# Patient Record
Sex: MALE | Race: WHITE | Employment: STUDENT | URBAN - METROPOLITAN AREA
[De-identification: names, ages, dates, MRNs, and addresses within clinical notes are randomized per-mention and may not be internally consistent; named-entity substitution may affect disease eponyms.]

---

## 2023-02-15 ENCOUNTER — IMMUNIZATION (OUTPATIENT)
Dept: FAMILY MEDICINE CLINIC | Facility: CLINIC | Age: 33
End: 2023-02-15
Payer: COMMERCIAL

## 2023-02-15 DIAGNOSIS — Z23 NEEDS FLU SHOT: Primary | ICD-10-CM

## 2023-02-15 PROCEDURE — 90686 IIV4 VACC NO PRSV 0.5 ML IM: CPT | Performed by: NURSE PRACTITIONER

## 2023-02-15 PROCEDURE — 90471 IMMUNIZATION ADMIN: CPT | Performed by: NURSE PRACTITIONER

## 2024-02-27 ENCOUNTER — OFFICE VISIT (OUTPATIENT)
Dept: FAMILY MEDICINE CLINIC | Facility: CLINIC | Age: 34
End: 2024-02-27
Payer: COMMERCIAL

## 2024-02-27 VITALS
SYSTOLIC BLOOD PRESSURE: 96 MMHG | RESPIRATION RATE: 18 BRPM | OXYGEN SATURATION: 98 % | WEIGHT: 185 LBS | HEART RATE: 82 BPM | HEIGHT: 70.75 IN | BODY MASS INDEX: 25.9 KG/M2 | DIASTOLIC BLOOD PRESSURE: 54 MMHG | TEMPERATURE: 97 F

## 2024-02-27 DIAGNOSIS — Z23 NEED FOR VACCINATION: ICD-10-CM

## 2024-02-27 DIAGNOSIS — L91.8 ST (SKIN TAG): ICD-10-CM

## 2024-02-27 DIAGNOSIS — Z20.2 HPV EXPOSURE: ICD-10-CM

## 2024-02-27 DIAGNOSIS — Z00.00 ENCOUNTER FOR ANNUAL PHYSICAL EXAM: Primary | ICD-10-CM

## 2024-02-27 PROBLEM — N52.9 ED (ERECTILE DYSFUNCTION): Status: ACTIVE | Noted: 2017-02-15

## 2024-02-27 NOTE — PROGRESS NOTES
BP 96/54   Pulse 82   Temp 97.3 °F (36.3 °C) (Temporal)   Resp 18   Ht 5' 10.75\" (1.797 m)   Wt 185 lb (83.9 kg)   SpO2 98%   BMI 25.98 kg/m²  Body mass index is 25.98 kg/m².     Chief Complaint   Patient presents with    [facility]    Moles     armla[place]itat       Epifanio Lowe is a 33 year old male who presents for a complete physical exam.   HPI:   Otherwise healthy male who is here today for the first time  He is here for his annual physical  Patient had a mole in the left axilla, that seems to be growing in size and now has raised surface and seems to be getting irritated  Patient does not have any family history of skin cancers    Patient's wife was diagnosed with HPV and precancerous lesions, patient is concerned if he will get it  It was diagnosed before they got  and she was pregnant  She is under the care of of gynecology  Patient has not been immunized    Wt Readings from Last 4 Encounters:   02/27/24 185 lb (83.9 kg)     Body mass index is 25.98 kg/m².   BP Readings from Last 3 Encounters:   02/27/24 96/54      No current outpatient medications on file.      Past Medical History:   Diagnosis Date    ACNE       History reviewed. No pertinent surgical history.   History reviewed. No pertinent family history.   Social History:  Social History     Socioeconomic History    Marital status:    Tobacco Use    Smoking status: Never    Smokeless tobacco: Never   Vaping Use    Vaping Use: Never used   Substance and Sexual Activity    Alcohol use: No      Exercise: none.  Diet: doesn't watch     REVIEW OF SYSTEMS:   GENERAL HEALTH: feels well otherwise, denies fever  SKIN: denies any unusual skin lesions or rashes  EYES: no visual complaints or deficits  HEENT: denies nasal congestion, sinus pain or sore throat; hearing loss negative  RESPIRATORY: denies shortness of breath, wheezing or cough  CARDIOVASCULAR: denies chest pain or JOSHI; no palpitations  GI: denies nausea, vomiting, constipation,  diarrhea; no rectal bleeding; no heartburn  :  (Male):denies nocturia or changes in stream  MUSCULOSKELETAL: no joint complaints upper or lower extremities  NEURO: no sensory or motor complaint  PSYCHE: no symptoms of depression or anxiety  HEMATOLOGY: denies h/o anemia; denies bruising or excessive bleeding  ENDOCRINE: denies excessive thirst or urination; denies unexpected wt gain or wt loss  ALLERGY/IMM.: denies food or seasonal allergies    EXAM:   BP 96/54   Pulse 82   Temp 97.3 °F (36.3 °C) (Temporal)   Resp 18   Ht 5' 10.75\" (1.797 m)   Wt 185 lb (83.9 kg)   SpO2 98%   BMI 25.98 kg/m²  Body mass index is 25.98 kg/m².   GENERAL: well developed, well nourished,in no apparent distress  SKIN: For 3 mm fleshy skin tag with irregular hyperpigmentation-left axilla  HEENT: atraumatic, normocephalic,ears and throat are clear  EYES:PERRLA, EOMI,conjunctiva are clear  NECK: supple,no adenopathy,no bruits  CHEST: no chest tenderness  LUNGS: clear to auscultation  CARDIO: RRR without murmur  GI: good BS's,no masses, HSM or tenderness  : Deferred  RECTAL:Deferred  MUSCULOSKELETAL: back is not tender,FROM of the back  EXTREMITIES: no cyanosis, clubbing or edema  NEURO: Oriented times three,cranial nerves are intact,motor and sensory are grossly intact    ASSESSMENT AND PLAN:   :Epifanio was seen today for [facility] care and moles.    Diagnoses and all orders for this visit:    Encounter for annual physical exam    Epifanio Lowe is a 33 year old male who presents for a complete physical exam.   Pt's weight is Body mass index is 25.98 kg/m².,   Eat a heart-healthy diet. Include potassium and fiber, and drink plenty of water.   Exercise regularly --- Dietary measures discussed include diet about 1800 calories - Excercise regimen also reviewed as well as long term benefits on overall health.   Recommend at least 30 minutes a day 3-4 times a week of aerobic activity such as brisk walking, cycling, aerobics, or  swimming.  Anerobic activities also encouraged for overall toning and strength/endurance building  Fasting labs ordered  Immunizations reviewed  Gardasil administered  Diet and exercise counseling given  Depression screen negative    -     CBC With Differential With Platelet; Future  -     Comp Metabolic Panel (14); Future  -     Lipid Panel; Future  -     TSH and Free T4; Future    ST (skin tag)  Patient is advised excision due to the hyperpigmentation as well as irregular nature of the fleshy skin tag  He will make a follow-up appointment for excision of the lesion  Exposure to HPV  Need for vaccination  Advised protection with barrier contraception  Will start Gardasil  First dose administered today  Will come back in a month for the second dose-     GARDASIL      The patient indicates understanding of these issues and agrees to the plan.  .      No follow-ups on file.        Jose Antonio Lopez MD, 2/27/2024, 2:40 PM      This dictation was performed with a verbal recognition program (DRAGON) and it was checked for errors. It is possible that there are still dictated errors within this office note. If so, please bring any errors to my attention for an addendum. All efforts were made to ensure that this office note is accurate

## 2024-04-02 ENCOUNTER — LAB ENCOUNTER (OUTPATIENT)
Dept: LAB | Age: 34
End: 2024-04-02
Attending: FAMILY MEDICINE
Payer: COMMERCIAL

## 2024-04-02 DIAGNOSIS — Z00.00 ENCOUNTER FOR ANNUAL PHYSICAL EXAM: ICD-10-CM

## 2024-04-02 LAB
ALBUMIN SERPL-MCNC: 4.2 G/DL (ref 3.4–5)
ALBUMIN/GLOB SERPL: 1.2 {RATIO} (ref 1–2)
ALP LIVER SERPL-CCNC: 49 U/L
ALT SERPL-CCNC: 19 U/L
ANION GAP SERPL CALC-SCNC: 5 MMOL/L (ref 0–18)
AST SERPL-CCNC: 12 U/L (ref 15–37)
BASOPHILS # BLD AUTO: 0.06 X10(3) UL (ref 0–0.2)
BASOPHILS NFR BLD AUTO: 0.5 %
BILIRUB SERPL-MCNC: 0.7 MG/DL (ref 0.1–2)
BUN BLD-MCNC: 13 MG/DL (ref 9–23)
CALCIUM BLD-MCNC: 9.5 MG/DL (ref 8.5–10.1)
CHLORIDE SERPL-SCNC: 104 MMOL/L (ref 98–112)
CHOLEST SERPL-MCNC: 157 MG/DL (ref ?–200)
CO2 SERPL-SCNC: 28 MMOL/L (ref 21–32)
CREAT BLD-MCNC: 1.01 MG/DL
EGFRCR SERPLBLD CKD-EPI 2021: 101 ML/MIN/1.73M2 (ref 60–?)
EOSINOPHIL # BLD AUTO: 0.14 X10(3) UL (ref 0–0.7)
EOSINOPHIL NFR BLD AUTO: 1.2 %
ERYTHROCYTE [DISTWIDTH] IN BLOOD BY AUTOMATED COUNT: 13.2 %
FASTING PATIENT LIPID ANSWER: YES
FASTING STATUS PATIENT QL REPORTED: YES
GLOBULIN PLAS-MCNC: 3.5 G/DL (ref 2.8–4.4)
GLUCOSE BLD-MCNC: 86 MG/DL (ref 70–99)
HCT VFR BLD AUTO: 44.1 %
HDLC SERPL-MCNC: 61 MG/DL (ref 40–59)
HGB BLD-MCNC: 15 G/DL
IMM GRANULOCYTES # BLD AUTO: 0.04 X10(3) UL (ref 0–1)
IMM GRANULOCYTES NFR BLD: 0.4 %
LDLC SERPL CALC-MCNC: 88 MG/DL (ref ?–100)
LYMPHOCYTES # BLD AUTO: 3.03 X10(3) UL (ref 1–4)
LYMPHOCYTES NFR BLD AUTO: 26.9 %
MCH RBC QN AUTO: 29.8 PG (ref 26–34)
MCHC RBC AUTO-ENTMCNC: 34 G/DL (ref 31–37)
MCV RBC AUTO: 87.7 FL
MONOCYTES # BLD AUTO: 1.18 X10(3) UL (ref 0.1–1)
MONOCYTES NFR BLD AUTO: 10.5 %
NEUTROPHILS # BLD AUTO: 6.8 X10 (3) UL (ref 1.5–7.7)
NEUTROPHILS # BLD AUTO: 6.8 X10(3) UL (ref 1.5–7.7)
NEUTROPHILS NFR BLD AUTO: 60.5 %
NONHDLC SERPL-MCNC: 96 MG/DL (ref ?–130)
OSMOLALITY SERPL CALC.SUM OF ELEC: 283 MOSM/KG (ref 275–295)
PLATELET # BLD AUTO: 241 10(3)UL (ref 150–450)
POTASSIUM SERPL-SCNC: 4.2 MMOL/L (ref 3.5–5.1)
PROT SERPL-MCNC: 7.7 G/DL (ref 6.4–8.2)
RBC # BLD AUTO: 5.03 X10(6)UL
SODIUM SERPL-SCNC: 137 MMOL/L (ref 136–145)
T4 FREE SERPL-MCNC: 1.2 NG/DL (ref 0.8–1.7)
TRIGL SERPL-MCNC: 36 MG/DL (ref 30–149)
TSI SER-ACNC: 0.42 MIU/ML (ref 0.36–3.74)
VLDLC SERPL CALC-MCNC: 6 MG/DL (ref 0–30)
WBC # BLD AUTO: 11.3 X10(3) UL (ref 4–11)

## 2024-04-02 PROCEDURE — 80050 GENERAL HEALTH PANEL: CPT | Performed by: FAMILY MEDICINE

## 2024-04-02 PROCEDURE — 80061 LIPID PANEL: CPT | Performed by: FAMILY MEDICINE

## 2024-04-02 PROCEDURE — 84439 ASSAY OF FREE THYROXINE: CPT | Performed by: FAMILY MEDICINE

## 2024-04-02 NOTE — PROGRESS NOTES
Please notify the patient of normal  Results except for slightly elevated WBC count at 11.3, we will discuss further at the time of your follow-up next week

## 2024-04-04 ENCOUNTER — OFFICE VISIT (OUTPATIENT)
Dept: FAMILY MEDICINE CLINIC | Facility: CLINIC | Age: 34
End: 2024-04-04
Payer: COMMERCIAL

## 2024-04-04 ENCOUNTER — TELEPHONE (OUTPATIENT)
Dept: FAMILY MEDICINE CLINIC | Facility: CLINIC | Age: 34
End: 2024-04-04

## 2024-04-04 VITALS
RESPIRATION RATE: 18 BRPM | DIASTOLIC BLOOD PRESSURE: 48 MMHG | TEMPERATURE: 97 F | HEART RATE: 63 BPM | HEIGHT: 70.75 IN | BODY MASS INDEX: 25.48 KG/M2 | OXYGEN SATURATION: 97 % | WEIGHT: 182 LBS | SYSTOLIC BLOOD PRESSURE: 100 MMHG

## 2024-04-04 DIAGNOSIS — Z23 NEED FOR VACCINATION: ICD-10-CM

## 2024-04-04 DIAGNOSIS — D22.9 ATYPICAL MOLE: Primary | ICD-10-CM

## 2024-04-04 PROCEDURE — 11400 EXC TR-EXT B9+MARG 0.5 CM<: CPT | Performed by: FAMILY MEDICINE

## 2024-04-04 PROCEDURE — 90651 9VHPV VACCINE 2/3 DOSE IM: CPT | Performed by: FAMILY MEDICINE

## 2024-04-04 PROCEDURE — 90471 IMMUNIZATION ADMIN: CPT | Performed by: FAMILY MEDICINE

## 2024-04-04 PROCEDURE — 3078F DIAST BP <80 MM HG: CPT | Performed by: FAMILY MEDICINE

## 2024-04-04 PROCEDURE — 3008F BODY MASS INDEX DOCD: CPT | Performed by: FAMILY MEDICINE

## 2024-04-04 PROCEDURE — 88305 TISSUE EXAM BY PATHOLOGIST: CPT | Performed by: FAMILY MEDICINE

## 2024-04-04 PROCEDURE — 3074F SYST BP LT 130 MM HG: CPT | Performed by: FAMILY MEDICINE

## 2024-04-04 PROCEDURE — 99213 OFFICE O/P EST LOW 20 MIN: CPT | Performed by: FAMILY MEDICINE

## 2024-04-04 RX ORDER — CEPHALEXIN 500 MG/1
500 CAPSULE ORAL 3 TIMES DAILY
Qty: 9 CAPSULE | Refills: 0 | Status: SHIPPED | OUTPATIENT
Start: 2024-04-04

## 2024-04-05 NOTE — PROGRESS NOTES
/48   Pulse 63   Temp 97.2 °F (36.2 °C) (Temporal)   Resp 18   Ht 5' 10.75\" (1.797 m)   Wt 182 lb (82.6 kg)   SpO2 97%   BMI 25.56 kg/m²               Chief Complaint   Patient presents with    Procedure     Skin tag removal         HPI;    Epifanio Lowe is a 33 year old male.  Here for excision of mole located on the left axilla measuring about 4 to 5 mm, oval with hyperpigmentation.      Wt Readings from Last 3 Encounters:   04/04/24 182 lb (82.6 kg)   02/27/24 185 lb (83.9 kg)     BP Readings from Last 3 Encounters:   04/04/24 100/48   02/27/24 96/54         ALLERGIES:  No Known Allergies  Current Outpatient Medications   Medication Sig Dispense Refill    cephalexin 500 MG Oral Cap Take 1 capsule (500 mg total) by mouth 3 (three) times daily. 9 capsule 0      Past Medical History:   Diagnosis Date    ACNE       Social History:  Social History     Socioeconomic History    Marital status:    Tobacco Use    Smoking status: Never    Smokeless tobacco: Never   Vaping Use    Vaping Use: Never used   Substance and Sexual Activity    Alcohol use: No        REVIEW OF SYSTEMS:   A comprehensive 10 point review of systems was completed.  Pertinent positives and negatives noted in the the HPI.   EXAM:   /48   Pulse 63   Temp 97.2 °F (36.2 °C) (Temporal)   Resp 18   Ht 5' 10.75\" (1.797 m)   Wt 182 lb (82.6 kg)   SpO2 97%   BMI 25.56 kg/m²   GENERAL: well developed, well nourished,in no apparent distress  SKIN: no rashes,no suspicious lesions  NECK: supple,no adenopathy,no bruits  LUNGS: clear to auscultation  CARDIO: RRR without murmur  EXTREMITIES: no cyanosis, clubbing or edema    ASSESSMENT AND PLAN:   Diagnoses and all orders for this visit:    Atypical mole  Excision of the mole done  Under aseptic precautions, area was cleaned with Betadine and alcohol,  Anesthesia with 2% lidocaine with epi  Using a punch biopsy forcep 6 mm, the lesion was excised  Good hemostasis  Wound closed with  4.0 Ethilon  Prescription given for Keflex 3 times a day for 3 days  Wound care instructions given  Patient will return in 10 days for suture removal  Lesion sent to pathology     cephalexin 500 MG Oral Cap; Take 1 capsule (500 mg total) by mouth 3 (three) times daily.  -     Specimen to Pathology, Tissue; Future    Need for vaccination  -     HPV (Gardasil 9)        The patient indicates understanding of these issues and agrees to the plan.  Imaging & Consults:  HPV HUMAN PAPILLOMA VIRUS VACC 9 MO 3 DOSE IM  Meds & Refills for this Visit:  Requested Prescriptions     Signed Prescriptions Disp Refills    cephalexin 500 MG Oral Cap 9 capsule 0     Sig: Take 1 capsule (500 mg total) by mouth 3 (three) times daily.     Orders Placed This Encounter   Procedures    HPV (Gardasil 9)    Specimen to Pathology, Tissue             Jose Antonio Lopez MD   [facility]  [address] [address]    Electronically signed    This dictation was performed with a verbal recognition program (DRAGON) and it was checked for errors. It is possible that there are still dictated errors within this office note. If so, please bring any errors to my attention for an addendum. All efforts were made to ensure that this office note is accurate

## 2024-04-16 ENCOUNTER — OFFICE VISIT (OUTPATIENT)
Dept: FAMILY MEDICINE CLINIC | Facility: CLINIC | Age: 34
End: 2024-04-16
Payer: COMMERCIAL

## 2024-04-16 VITALS
HEIGHT: 70.75 IN | HEART RATE: 79 BPM | BODY MASS INDEX: 24.64 KG/M2 | RESPIRATION RATE: 18 BRPM | DIASTOLIC BLOOD PRESSURE: 62 MMHG | OXYGEN SATURATION: 97 % | SYSTOLIC BLOOD PRESSURE: 110 MMHG | WEIGHT: 176 LBS | TEMPERATURE: 98 F

## 2024-04-16 DIAGNOSIS — Z48.02 VISIT FOR SUTURE REMOVAL: Primary | ICD-10-CM

## 2024-04-16 DIAGNOSIS — D23.9 INTRADERMAL NEVUS: ICD-10-CM

## 2024-04-16 PROCEDURE — 3078F DIAST BP <80 MM HG: CPT | Performed by: FAMILY MEDICINE

## 2024-04-16 PROCEDURE — 3074F SYST BP LT 130 MM HG: CPT | Performed by: FAMILY MEDICINE

## 2024-04-16 PROCEDURE — 99212 OFFICE O/P EST SF 10 MIN: CPT | Performed by: FAMILY MEDICINE

## 2024-04-16 PROCEDURE — 3008F BODY MASS INDEX DOCD: CPT | Performed by: FAMILY MEDICINE

## 2024-04-16 NOTE — PROGRESS NOTES
/62   Pulse 79   Temp 98.2 °F (36.8 °C) (Temporal)   Resp 18   Ht 5' 10.75\" (1.797 m)   Wt 176 lb (79.8 kg)   SpO2 97%   BMI 24.72 kg/m²               Chief Complaint   Patient presents with    Suture Removal        HPI;    Epifanio Lowe is a 33 year old male.  Here for suture removal      Wt Readings from Last 3 Encounters:   04/16/24 176 lb (79.8 kg)   04/04/24 182 lb (82.6 kg)   02/27/24 185 lb (83.9 kg)     BP Readings from Last 3 Encounters:   04/16/24 110/62   04/04/24 100/48   02/27/24 96/54         ALLERGIES:  No Known Allergies  No current outpatient medications on file.      Past Medical History:    ACNE      Social History:  Social History     Socioeconomic History    Marital status:    Tobacco Use    Smoking status: Never    Smokeless tobacco: Never   Vaping Use    Vaping status: Never Used   Substance and Sexual Activity    Alcohol use: No        REVIEW OF SYSTEMS:   A comprehensive 10 point review of systems was completed.  Pertinent positives and negatives noted in the the HPI.   EXAM:   /62   Pulse 79   Temp 98.2 °F (36.8 °C) (Temporal)   Resp 18   Ht 5' 10.75\" (1.797 m)   Wt 176 lb (79.8 kg)   SpO2 97%   BMI 24.72 kg/m²   GENERAL: well developed, well nourished,in no apparent distress  SKIN: no rashes,no suspicious lesions  Left axilla, incision clean dry and intact, no sign of infection  ASSESSMENT AND PLAN:   Diagnoses and all orders for this visit:    Visit for suture removal  Intradermal nevus  2 sutures removed without any complications  Patient is released to go back to full activities without any restrictions      The patient indicates understanding of these issues and agrees to the plan.  Imaging & Consults:  None  Meds & Refills for this Visit:  Requested Prescriptions      No prescriptions requested or ordered in this encounter     No orders of the defined types were placed in this encounter.            Jose Antonio Lopez MD   [facility]  [address] [place] [place] [id number]    Electronically signed    This dictation was performed with a verbal recognition program (DRAGON) and it was checked for errors. It is possible that there are still dictated errors within this office note. If so, please bring any errors to my attention for an addendum. All efforts were made to ensure that this office note is accurate

## 2024-06-04 ENCOUNTER — PATIENT MESSAGE (OUTPATIENT)
Dept: FAMILY MEDICINE CLINIC | Facility: CLINIC | Age: 34
End: 2024-06-04

## 2024-06-04 NOTE — TELEPHONE ENCOUNTER
From: Epifanio Lowe  To: Jose Antonio Lopez  Sent: 6/4/2024 9:40 AM CDT  Subject: third shot for hpv    good morning,    I'm not seeing in my notes when dr arrington wanted me to come in for my third and final shot. Can you please advise when I am due to return so i can make the appoitment?    Ken

## 2024-08-28 ENCOUNTER — NURSE ONLY (OUTPATIENT)
Dept: FAMILY MEDICINE CLINIC | Facility: CLINIC | Age: 34
End: 2024-08-28
Payer: COMMERCIAL

## 2024-08-28 PROCEDURE — 90471 IMMUNIZATION ADMIN: CPT | Performed by: FAMILY MEDICINE

## 2024-08-28 PROCEDURE — 90651 9VHPV VACCINE 2/3 DOSE IM: CPT | Performed by: FAMILY MEDICINE

## 2025-04-30 ENCOUNTER — OFFICE VISIT (OUTPATIENT)
Dept: FAMILY MEDICINE CLINIC | Facility: CLINIC | Age: 35
End: 2025-04-30
Payer: COMMERCIAL

## 2025-04-30 VITALS
RESPIRATION RATE: 16 BRPM | TEMPERATURE: 98 F | DIASTOLIC BLOOD PRESSURE: 70 MMHG | HEART RATE: 78 BPM | SYSTOLIC BLOOD PRESSURE: 110 MMHG | HEIGHT: 70.75 IN | OXYGEN SATURATION: 99 % | WEIGHT: 184.13 LBS | BODY MASS INDEX: 25.78 KG/M2

## 2025-04-30 DIAGNOSIS — Z00.00 ENCOUNTER FOR ANNUAL PHYSICAL EXAM: Primary | ICD-10-CM

## 2025-04-30 PROCEDURE — 99395 PREV VISIT EST AGE 18-39: CPT | Performed by: FAMILY MEDICINE

## 2025-04-30 RX ORDER — DIAZEPAM 5 MG/1
TABLET ORAL
COMMUNITY
Start: 2025-04-15

## 2025-04-30 NOTE — PROGRESS NOTES
/70   Pulse 78   Temp 97.7 °F (36.5 °C) (Temporal)   Resp 16   Ht 5' 10.75\" (1.797 m)   Wt 184 lb 2 oz (83.5 kg)   SpO2 99%   BMI 25.86 kg/m²  Body mass index is 25.86 kg/m².     Chief Complaint   Patient presents with    Physical     The following individual(s) verbally consented to be recorded using ambient AI listening technology and understand that they can each withdraw their consent to this listening technology at any point by asking the clinician to turn off or pause the recording:  Patient name: Epifanio Lowe is a 34 year old male who presents for a complete physical exam.   HPI:     History of Present Illness  Mr. Epifanio Lowe is a 34 year old male who presents for a yearly checkup with concerns about kidney health due to family history.    He is concerned about his kidney health because his father required a kidney transplant several years ago. His father had a history of being overweight and possibly had high blood pressure, although this was not confirmed. The exact cause of his father's kidney failure is unknown to him.    He experienced a recent episode of back pain, diagnosed as a muscle spasm at an urgent care visit last week. He was prescribed benzodiazepines for muscle relaxation and takes 600 mg of ibuprofen for pain, with the option to take Tylenol if needed. The pain recurs if he resumes normal activities too soon, but he is not currently experiencing pain.    No recent surgeries since his last visit a year ago. He does not smoke. He has gained weight, which he attributes to 'sympathy weight' from his wife's pregnancy and increased muscle mass from lifting.    He has not received a COVID-19 vaccine this year and is not interested in doing so.        Wt Readings from Last 4 Encounters:   04/30/25 184 lb 2 oz (83.5 kg)   04/16/24 176 lb (79.8 kg)   04/04/24 182 lb (82.6 kg)   02/27/24 185 lb (83.9 kg)     Body mass index is 25.86 kg/m².   BP  Readings from Last 3 Encounters:   04/30/25 110/70   04/16/24 110/62   04/04/24 100/48      Current Medications[1]   Past Medical History[2]   Past Surgical History[3]   Family History[4]   Social History:  Social Hx on file[5]   Exercise: none.  Diet: doesn't watch     REVIEW OF SYSTEMS:   GENERAL HEALTH: feels well otherwise, denies fever  SKIN: denies any unusual skin lesions or rashes  EYES: no visual complaints or deficits  HEENT: denies nasal congestion, sinus pain or sore throat; hearing loss negative  RESPIRATORY: denies shortness of breath, wheezing or cough  CARDIOVASCULAR: denies chest pain or JOSHI; no palpitations  GI: denies nausea, vomiting, constipation, diarrhea; no rectal bleeding; no heartburn  MUSCULOSKELETAL: no joint complaints upper or lower extremities  NEURO: no sensory or motor complaint  PSYCHE: no symptoms of depression or anxiety  HEMATOLOGY: denies h/o anemia; denies bruising or excessive bleeding  ENDOCRINE: denies excessive thirst or urination; denies unexpected wt gain or wt loss  ALLERGY/IMM.: denies food or seasonal allergies    EXAM:   /70   Pulse 78   Temp 97.7 °F (36.5 °C) (Temporal)   Resp 16   Ht 5' 10.75\" (1.797 m)   Wt 184 lb 2 oz (83.5 kg)   SpO2 99%   BMI 25.86 kg/m²  Body mass index is 25.86 kg/m².   GENERAL: well developed, well nourished,in no apparent distress  SKIN: no rashes,no suspicious lesions  HEENT: atraumatic, normocephalic,ears and throat are clear  EYES:PERRLA, EOMI,conjunctiva are clear  NECK: supple,no adenopathy,no bruits  CHEST: no chest tenderness  LUNGS: clear to auscultation  CARDIO: RRR without murmur  GI: good BS's,no masses, HSM or tenderness  : Deferred  RECTAL:Deferred  MUSCULOSKELETAL: back is not tender,FROM of the back  EXTREMITIES: no cyanosis, clubbing or edema  NEURO: Oriented times three,cranial nerves are intact,motor and sensory are grossly intact    ASSESSMENT AND PLAN:   :Epifanio was seen today for physical.    Diagnoses  and all orders for this visit:    Encounter for annual physical exam  Epifanio Lowe is a 34 year old male who presents for a complete physical exam.   Pt's weight is Body mass index is 25.86 kg/m².,   Eat a heart-healthy diet. Include potassium and fiber, and drink plenty of water.   Exercise regularly --- Dietary measures discussed include diet about 1800 calories - Excercise regimen also reviewed as well as long term benefits on overall health.   Recommend at least 30 minutes a day 3-4 times a week of aerobic activity such as brisk walking, cycling, aerobics, or swimming.  Anerobic activities also encouraged for overall toning and strength/endurance building  Annual wellness visit.   Family history of paternal kidney transplant of unknown etiology. No significant health changes over the past year. Weight increased from 176 to 184 pounds, attributed to muscle gain and possible sympathy weight due to wife's pregnancy. No surgeries since last visit. Non-smoker. No COVID vaccination for the current year.  - Order CBC, CMP, lipid panel, and thyroid function tests at [facility]  - Advise 10-14 hour fasting before blood test  - Discuss importance of family history of kidney disease  - Discuss risks of unnecessary NSAID use due to nephrotoxicity    -     CBC With Differential With Platelet  -     Comp Metabolic Panel (14)  -     Lipid Panel  -     TSH and Free T4  Muscle spasm  Recent severe back muscle spasm necessitating urgent care. Benzodiazepines used, but reports intermittent pain recurrence. Currently asymptomatic. Educated on muscle relaxants versus analgesics.  - Advise against unnecessary ibuprofen use due to nephrotoxicity risk  - Educate on difference between muscle relaxants and analgesics          The patient indicates understanding of these issues and agrees to the plan.  .      No follow-ups on file.        Jose Antonio Lopez MD, 4/30/2025, 12:54 PM      This dictation was performed with a verbal  recognition program (DRAGON) and it was checked for errors. It is possible that there are still dictated errors within this office note. If so, please bring any errors to my attention for an addendum. All efforts were made to ensure that this office note is accurate            [1]   Current Outpatient Medications   Medication Sig Dispense Refill    diazePAM 5 MG Oral Tab  (Patient not taking: Reported on 4/30/2025)     [2]   Past Medical History:   ACNE   [3] History reviewed. No pertinent surgical history.  [4]   Family History  Problem Relation Age of Onset    Other (renal transplant) Father    [5]   Social History  Socioeconomic History    Marital status:    Tobacco Use    Smoking status: Never    Smokeless tobacco: Never   Vaping Use    Vaping status: Never Used   Substance and Sexual Activity    Alcohol use: No

## 2025-07-30 LAB
ABSOLUTE BASOPHILS: 59 CELLS/UL (ref 0–200)
ABSOLUTE EOSINOPHILS: 104 CELLS/UL (ref 15–500)
ABSOLUTE LYMPHOCYTES: 2743 CELLS/UL (ref 850–3900)
ABSOLUTE MONOCYTES: 624 CELLS/UL (ref 200–950)
ABSOLUTE NEUTROPHILS: 2971 CELLS/UL (ref 1500–7800)
ALBUMIN/GLOBULIN RATIO: 2 (CALC) (ref 1–2.5)
ALBUMIN: 4.9 G/DL (ref 3.6–5.1)
ALKALINE PHOSPHATASE: 42 U/L (ref 36–130)
ALT: 14 U/L (ref 9–46)
AST: 15 U/L (ref 10–40)
BASOPHILS: 0.9 %
BILIRUBIN, TOTAL: 0.7 MG/DL (ref 0.2–1.2)
BUN: 20 MG/DL (ref 7–25)
CALCIUM: 9.7 MG/DL (ref 8.6–10.3)
CARBON DIOXIDE: 29 MMOL/L (ref 20–32)
CHLORIDE: 103 MMOL/L (ref 98–110)
CHOL/HDLC RATIO: 2.6 (CALC)
CHOLESTEROL, TOTAL: 182 MG/DL
CREATININE: 0.88 MG/DL (ref 0.6–1.26)
EGFR: 116 ML/MIN/1.73M2
EOSINOPHILS: 1.6 %
GLOBULIN: 2.5 G/DL (CALC) (ref 1.9–3.7)
GLUCOSE: 85 MG/DL (ref 65–99)
HDL CHOLESTEROL: 71 MG/DL
HEMATOCRIT: 48.1 % (ref 38.5–50)
HEMOGLOBIN: 15.6 G/DL (ref 13.2–17.1)
LDL-CHOLESTEROL: 98 MG/DL (CALC)
LYMPHOCYTES: 42.2 %
MCH: 29.4 PG (ref 27–33)
MCHC: 32.4 G/DL (ref 32–36)
MCV: 90.8 FL (ref 80–100)
MONOCYTES: 9.6 %
MPV: 10.8 FL (ref 7.5–12.5)
NEUTROPHILS: 45.7 %
NON-HDL CHOLESTEROL: 111 MG/DL (CALC)
PLATELET COUNT: 250 THOUSAND/UL (ref 140–400)
POTASSIUM: 4.9 MMOL/L (ref 3.5–5.3)
PROTEIN, TOTAL: 7.4 G/DL (ref 6.1–8.1)
RDW: 12.7 % (ref 11–15)
RED BLOOD CELL COUNT: 5.3 MILLION/UL (ref 4.2–5.8)
SODIUM: 140 MMOL/L (ref 135–146)
T4, FREE: 1.4 NG/DL (ref 0.8–1.8)
TRIGLYCERIDES: 45 MG/DL
TSH: 0.79 MIU/L (ref 0.4–4.5)
WHITE BLOOD CELL COUNT: 6.5 THOUSAND/UL (ref 3.8–10.8)